# Patient Record
Sex: FEMALE | Race: WHITE | NOT HISPANIC OR LATINO | URBAN - METROPOLITAN AREA
[De-identification: names, ages, dates, MRNs, and addresses within clinical notes are randomized per-mention and may not be internally consistent; named-entity substitution may affect disease eponyms.]

---

## 2022-05-30 ENCOUNTER — OFFICE VISIT (OUTPATIENT)
Dept: FAMILY MEDICINE | Facility: CLINIC | Age: 67
End: 2022-05-30
Payer: MEDICARE

## 2022-05-30 ENCOUNTER — TELEPHONE (OUTPATIENT)
Dept: FAMILY MEDICINE | Facility: CLINIC | Age: 67
End: 2022-05-30

## 2022-05-30 VITALS
BODY MASS INDEX: 34.49 KG/M2 | OXYGEN SATURATION: 95 % | RESPIRATION RATE: 18 BRPM | HEIGHT: 65 IN | WEIGHT: 207 LBS | HEART RATE: 80 BPM | SYSTOLIC BLOOD PRESSURE: 100 MMHG | TEMPERATURE: 98 F | DIASTOLIC BLOOD PRESSURE: 68 MMHG

## 2022-05-30 DIAGNOSIS — R26.89 IMPAIRED GAIT AND MOBILITY: Chronic | ICD-10-CM

## 2022-05-30 DIAGNOSIS — E66.09 CLASS 1 OBESITY DUE TO EXCESS CALORIES WITH SERIOUS COMORBIDITY AND BODY MASS INDEX (BMI) OF 34.0 TO 34.9 IN ADULT: Chronic | ICD-10-CM

## 2022-05-30 DIAGNOSIS — M81.0 OSTEOPOROSIS, POSTMENOPAUSAL: ICD-10-CM

## 2022-05-30 DIAGNOSIS — Z11.59 ENCOUNTER FOR SCREENING FOR OTHER VIRAL DISEASES: ICD-10-CM

## 2022-05-30 DIAGNOSIS — G47.33 OSA ON CPAP: Chronic | ICD-10-CM

## 2022-05-30 DIAGNOSIS — E11.9 TYPE 2 DIABETES MELLITUS WITHOUT COMPLICATION, WITHOUT LONG-TERM CURRENT USE OF INSULIN: Primary | Chronic | ICD-10-CM

## 2022-05-30 DIAGNOSIS — Z13.820 OSTEOPOROSIS SCREENING: ICD-10-CM

## 2022-05-30 DIAGNOSIS — I50.32 CHRONIC DIASTOLIC CONGESTIVE HEART FAILURE: Chronic | ICD-10-CM

## 2022-05-30 DIAGNOSIS — I48.19 PERSISTENT ATRIAL FIBRILLATION: Chronic | ICD-10-CM

## 2022-05-30 DIAGNOSIS — J45.40 MODERATE PERSISTENT ASTHMA WITHOUT COMPLICATION: Chronic | ICD-10-CM

## 2022-05-30 PROBLEM — E66.811 CLASS 1 OBESITY DUE TO EXCESS CALORIES WITH SERIOUS COMORBIDITY AND BODY MASS INDEX (BMI) OF 34.0 TO 34.9 IN ADULT: Status: ACTIVE | Noted: 2022-05-30

## 2022-05-30 PROCEDURE — 99204 OFFICE O/P NEW MOD 45 MIN: CPT | Mod: S$GLB,,, | Performed by: NURSE PRACTITIONER

## 2022-05-30 PROCEDURE — 99204 PR OFFICE/OUTPT VISIT, NEW, LEVL IV, 45-59 MIN: ICD-10-PCS | Mod: S$GLB,,, | Performed by: NURSE PRACTITIONER

## 2022-05-30 RX ORDER — METOPROLOL TARTRATE 100 MG/1
100 TABLET ORAL 2 TIMES DAILY
COMMUNITY
Start: 2022-05-10 | End: 2023-02-23 | Stop reason: DRUGHIGH

## 2022-05-30 RX ORDER — DAPAGLIFLOZIN 10 MG/1
10 TABLET, FILM COATED ORAL DAILY
COMMUNITY
End: 2023-02-23 | Stop reason: DRUGHIGH

## 2022-05-30 RX ORDER — WARFARIN 6 MG/1
6 TABLET ORAL DAILY
COMMUNITY
Start: 2022-05-05 | End: 2023-02-23 | Stop reason: DRUGHIGH

## 2022-05-30 RX ORDER — OMEPRAZOLE 40 MG/1
40 CAPSULE, DELAYED RELEASE ORAL DAILY
COMMUNITY

## 2022-05-30 RX ORDER — DIGOXIN 125 MCG
0.12 TABLET ORAL DAILY
COMMUNITY
Start: 2022-05-13

## 2022-05-30 RX ORDER — GLIMEPIRIDE 1 MG/1
1 TABLET ORAL EVERY MORNING
COMMUNITY
Start: 2022-05-23 | End: 2023-02-23 | Stop reason: DRUGHIGH

## 2022-05-30 RX ORDER — POTASSIUM CHLORIDE 750 MG/1
10 TABLET, EXTENDED RELEASE ORAL DAILY
COMMUNITY
Start: 2022-05-05 | End: 2022-06-13 | Stop reason: DRUGHIGH

## 2022-05-30 RX ORDER — FUROSEMIDE 80 MG/1
80 TABLET ORAL DAILY
COMMUNITY
Start: 2022-05-11 | End: 2023-02-23 | Stop reason: DRUGHIGH

## 2022-05-30 RX ORDER — METFORMIN HYDROCHLORIDE 1000 MG/1
1000 TABLET ORAL 3 TIMES DAILY
COMMUNITY
End: 2023-02-23 | Stop reason: DRUGHIGH

## 2022-05-30 NOTE — TELEPHONE ENCOUNTER
----- Message from Elisha Fleming sent at 5/30/2022 11:10 AM CDT -----  Patient need to speak to nurse regarding a prescription for a bedside commode . Call back number [phone] (home)

## 2022-05-30 NOTE — PROGRESS NOTES
"Subjective:       Patient ID: Joanna Flores is a 67 y.o. female.    Chief Complaint: Establish Care (Pt is here to establish care. Pt doesn't have a previous PCP. Pt is also here for a hospital follow up. Pt states she has been feeling weak and overall not good since being discharged.)    Here to establish primary care. She is a 66 yo female. She lives in [place]. She is  with 2 adult children and 1 grandchild. Her  passed from leukemia 15 yr ago.She does not smoke.     She has Atrial fib, DM2, obesity, CHF, asthma, MARCI/CPAP. Her specialists have been urging her to get established with a primary care provider for some time. At present, she sees Dr Pulido who is her pulmonologist, David SMITH who is her endocrinologist, and Dr Castañeda who is her cardiologist.     She was recently admitted to [facility] in May 2022 with uncontrolled A fib with RVR and sepsis secondary to cellulitis LLL. She reports she "feeling better" since her hospital admission--just feeling tired. She has f/u appt schedule with her cardiologist and endocrinologist within the next month. She is compliant with all her medications. He believes her last A1c was around 7.0% and her endocrinologist was very pleased with the results. No new issues, just wants to get established.         Review of Systems   Constitutional: Positive for fatigue. Negative for activity change, appetite change and fever.   Respiratory: Negative for chest tightness and shortness of breath.    Cardiovascular: Negative for chest pain and palpitations.   Gastrointestinal: Negative for abdominal pain, diarrhea, nausea and vomiting.   Neurological: Negative for dizziness, weakness, light-headedness and headaches.   Psychiatric/Behavioral: Negative for dysphoric mood and sleep disturbance. The patient is not nervous/anxious.            Past Medical History:  Past Medical History:   Diagnosis Date    Asthma     Atrial fibrillation     CHF (congestive heart " failure)     Diabetes mellitus, type 2       Past Surgical History:   Procedure Laterality Date    BLADDER SUSPENSION      CHOLECYSTECTOMY      EYE SURGERY      HYSTERECTOMY      MIDDLE EAR SURGERY      NECK SURGERY        Review of patient's allergies indicates:   Allergen Reactions    Cefazolin     Egg     Lactase     Meperidine       Current Outpatient Medications   Medication Sig Dispense Refill    dapagliflozin (FARXIGA) 10 mg tablet Take 10 mg by mouth once daily.      digoxin (LANOXIN) 125 mcg tablet Take 0.125 mg by mouth once daily.      furosemide (LASIX) 80 MG tablet Take 80 mg by mouth once daily.      glimepiride (AMARYL) 1 MG tablet Take 1 mg by mouth every morning.      metFORMIN (GLUCOPHAGE) 1000 MG tablet Take 1,000 mg by mouth 3 (three) times daily.      metoprolol tartrate (LOPRESSOR) 100 MG tablet Take 100 mg by mouth 2 (two) times a day.      omeprazole (PRILOSEC) 40 MG capsule Take 40 mg by mouth once daily.      potassium chloride (KLOR-CON) 10 MEQ TbSR Take 10 mEq by mouth once daily.      warfarin (COUMADIN) 6 MG tablet Take 6 mg by mouth Daily.       No current facility-administered medications for this visit.     Social History     Socioeconomic History    Marital status: Unknown   Tobacco Use    Smoking status: Never Smoker    Smokeless tobacco: Never Used   Substance and Sexual Activity    Alcohol use: Never    Drug use: Never    Sexual activity: Not Currently      Family History   Problem Relation Age of Onset    Cancer Mother     Heart disease Father     Cancer Father         Objective:      Physical Exam  Constitutional:       Appearance: She is well-developed. She is obese.   HENT:      Head: Normocephalic and atraumatic.   Eyes:      General: No scleral icterus.     Conjunctiva/sclera: Conjunctivae normal.      Pupils: Pupils are equal, round, and reactive to light.   Neck:      Thyroid: No thyroid mass.      Trachea: Trachea normal.   Cardiovascular:       Rate and Rhythm: Normal rate. Rhythm irregular.      Heart sounds: Normal heart sounds.   Pulmonary:      Effort: Pulmonary effort is normal.      Breath sounds: Normal breath sounds.   Musculoskeletal:      Cervical back: Normal range of motion and neck supple.   Neurological:      Mental Status: She is alert and oriented to person, place, and time.   Psychiatric:         Behavior: Behavior normal.         Judgment: Judgment normal.         Assessment:     1. Type 2 diabetes mellitus without complication, without long-term current use of insulin Stable   2. Chronic diastolic congestive heart failure Stable   3. Persistent atrial fibrillation Stable   4. Moderate persistent asthma without complication Stable   5. MARCI on CPAP Stable   6. Class 1 obesity due to excess calories with serious comorbidity and body mass index (BMI) of 34.0 to 34.9 in adult Active   7. Impaired gait and mobility Active   8. Encounter for screening for other viral diseases    9. Osteoporosis screening    10. Osteoporosis, postmenopausal      Plan:       PROBLEM LIST     Type 2 diabetes mellitus without complication, without long-term current use of insulin  Comments:  Last A1c 7.0%; will obtain new A1c when she returns for labs Wed; I will share lab results w/ all her specialists.   Orders:  -     CBC Auto Differential; Future; Expected date: 05/30/2022  -     Comprehensive Metabolic Panel; Future; Expected date: 05/30/2022  -     TSH w/reflex to FT4; Future; Expected date: 05/30/2022  -     Lipid Panel; Future; Expected date: 05/30/2022  -     Iron and TIBC; Future; Expected date: 05/30/2022  -     Hemoglobin A1C; Future; Expected date: 05/30/2022    Chronic diastolic congestive heart failure  Comments:  stable since her discharge from hospital; 30 lb weight loss since treating with diuretic  Orders:  -     CBC Auto Differential; Future; Expected date: 05/30/2022  -     Comprehensive Metabolic Panel; Future; Expected date: 05/30/2022  -      TSH w/reflex to FT4; Future; Expected date: 05/30/2022  -     Lipid Panel; Future; Expected date: 05/30/2022  -     Iron and TIBC; Future; Expected date: 05/30/2022    Persistent atrial fibrillation  Comments:  rate controlled w/ current regimen  Orders:  -     CBC Auto Differential; Future; Expected date: 05/30/2022  -     Comprehensive Metabolic Panel; Future; Expected date: 05/30/2022  -     TSH w/reflex to FT4; Future; Expected date: 05/30/2022  -     Lipid Panel; Future; Expected date: 05/30/2022  -     Iron and TIBC; Future; Expected date: 05/30/2022    Moderate persistent asthma without complication  Comments:  no recent exacerbations; asked to bring all inhalers to her next appt    MARCI on CPAP  Comments:  compliant w/ CPAP; continue    Class 1 obesity due to excess calories with serious comorbidity and body mass index (BMI) of 34.0 to 34.9 in adult  Comments:  recommend low fat ADA diet and regular exercise (walking) 4-5 days per week to reduce mean glucose and BMI    Impaired gait and mobility  Comments:  required walking aid ( has cane today); she is requesting a bedside commode due to her fall risk at night; order placed  Orders:  -     COMMODE FOR HOME USE    Encounter for screening for other viral diseases  -     Hepatitis C antibody; Future; Expected date: 05/30/2022    Osteoporosis screening  -     DXA Bone Density Spine And Hip; Future; Expected date: 05/30/2022    Osteoporosis, postmenopausal  -     DXA Bone Density Spine And Hip; Future; Expected date: 05/30/2022        Will attempt to obtain all historical medical records from her specialists

## 2022-06-01 LAB
ABS NRBC COUNT: 0 X 10 3/UL (ref 0–0.01)
ABSOLUTE BASOPHIL: 0.05 X 10 3/UL (ref 0–0.22)
ABSOLUTE EOSINOPHIL: 0.07 X 10 3/UL (ref 0.04–0.54)
ABSOLUTE IMMATURE GRAN: 0.05 X 10 3/UL (ref 0–0.04)
ABSOLUTE LYMPHOCYTE: 1.36 X 10 3/UL (ref 0.86–4.75)
ABSOLUTE MONOCYTE: 0.84 X 10 3/UL (ref 0.22–1.08)
ALBUMIN SERPL-MCNC: 4.6 G/DL (ref 3.5–5.2)
ALBUMIN/GLOB SERPL ELPH: 1.6 {RATIO} (ref 1–2.7)
ALP ISOS SERPL LEV INH-CCNC: 80 U/L (ref 35–105)
ALT (SGPT): 50 U/L (ref 0–33)
ANION GAP SERPL CALC-SCNC: 17 MMOL/L (ref 8–17)
AST SERPL-CCNC: 32 U/L (ref 0–32)
BASOPHILS NFR BLD: 0.5 % (ref 0.2–1.2)
BILIRUBIN, TOTAL: 0.57 MG/DL (ref 0–1.2)
BUN/CREAT SERPL: 39.4 (ref 6–20)
CALCIUM SERPL-MCNC: 10.1 MG/DL (ref 8.6–10.2)
CARBON DIOXIDE, CO2: 32 MMOL/L (ref 22–29)
CHLORIDE: 89 MMOL/L (ref 98–107)
CHOLEST SERPL-MSCNC: 184 MG/DL (ref 100–200)
CREAT SERPL-MCNC: 1.28 MG/DL (ref 0.5–0.9)
EOSINOPHIL NFR BLD: 0.7 % (ref 0.7–7)
GFR ESTIMATION: 41.35
GLOBULIN: 2.9 G/DL (ref 1.5–4.5)
GLUCOSE: 237 MG/DL (ref 82–115)
HCT VFR BLD AUTO: 44.3 % (ref 37–47)
HCV IGG SERPL QL IA: NONREACTIVE
HDLC SERPL-MCNC: 44 MG/DL
HGB BLD-MCNC: 14.4 G/DL (ref 12–16)
IMMATURE GRANULOCYTES: 0.5 % (ref 0–0.5)
IRON BINDING CAPACITY: 347 UG/DL (ref 262–472)
IRON SERPL-MCNC: 52 UG/DL (ref 37–145)
LDL/HDL RATIO: 2.2 (ref 1–3)
LDLC SERPL CALC-MCNC: 96.2 MG/DL (ref 0–100)
LYMPHOCYTES NFR BLD: 14 % (ref 19.3–53.1)
MCH RBC QN AUTO: 28.9 PG (ref 27–32)
MCHC RBC AUTO-ENTMCNC: 32.5 G/DL (ref 32–36)
MCV RBC AUTO: 88.8 FL (ref 82–100)
MONOCYTES NFR BLD: 8.6 % (ref 4.7–12.5)
NEUTROPHILS # BLD AUTO: 7.36 X 10 3/UL (ref 2.15–7.56)
NEUTROPHILS NFR BLD: 75.7 % (ref 34–71.1)
NUCLEATED RED BLOOD CELLS: 0 /100 WBC (ref 0–0.2)
PLATELET # BLD AUTO: 326 X 10 3/UL (ref 135–400)
POTASSIUM: 2.7 MMOL/L (ref 3.5–5.1)
PROT SNV-MCNC: 7.5 G/DL (ref 6.4–8.3)
RBC # BLD AUTO: 4.99 X 10 6/UL (ref 4.2–5.4)
RDW-SD: 43.6 FL (ref 37–54)
SODIUM: 138 MMOL/L (ref 136–145)
TRIGL SERPL-MCNC: 219 MG/DL (ref 0–150)
TSH W/REFLEX TO FT4: 0.71 UIU/ML (ref 0.27–4.2)
UIBC SERPL-MCNC: 295 UG/DL (ref 112–306)
UREA NITROGEN (BUN): 50.4 MG/DL (ref 8–23)
WBC # BLD: 9.73 X 10 3/UL (ref 4.3–10.8)

## 2022-06-03 ENCOUNTER — TELEPHONE (OUTPATIENT)
Dept: FAMILY MEDICINE | Facility: CLINIC | Age: 67
End: 2022-06-03
Payer: MEDICARE

## 2022-06-03 ENCOUNTER — TELEPHONE (OUTPATIENT)
Dept: FAMILY MEDICINE | Facility: CLINIC | Age: 67
End: 2022-06-03

## 2022-06-03 ENCOUNTER — OFFICE VISIT (OUTPATIENT)
Dept: FAMILY MEDICINE | Facility: CLINIC | Age: 67
End: 2022-06-03
Payer: MEDICARE

## 2022-06-03 DIAGNOSIS — N39.0 ACUTE UTI: ICD-10-CM

## 2022-06-03 DIAGNOSIS — R30.0 DYSURIA: Primary | ICD-10-CM

## 2022-06-03 LAB
BILIRUB SERPL-MCNC: NEGATIVE MG/DL
BLOOD URINE, POC: ABNORMAL
CLARITY, POC UA: ABNORMAL
COLOR, POC UA: YELLOW
GLUCOSE UR QL STRIP: 500
KETONES UR QL STRIP: NEGATIVE
LEUKOCYTE ESTERASE URINE, POC: ABNORMAL
NITRITE, POC UA: POSITIVE
PH, POC UA: 5.5
PROTEIN, POC: NEGATIVE
SPECIFIC GRAVITY, POC UA: 1.01
UROBILINOGEN, POC UA: 0.2

## 2022-06-03 PROCEDURE — 99211 OFF/OP EST MAY X REQ PHY/QHP: CPT | Mod: 25,S$GLB,, | Performed by: NURSE PRACTITIONER

## 2022-06-03 PROCEDURE — 81002 POCT URINE DIPSTICK WITHOUT MICROSCOPE: ICD-10-PCS | Mod: S$GLB,,, | Performed by: NURSE PRACTITIONER

## 2022-06-03 PROCEDURE — 81002 URINALYSIS NONAUTO W/O SCOPE: CPT | Mod: S$GLB,,, | Performed by: NURSE PRACTITIONER

## 2022-06-03 PROCEDURE — 99211 PR OFFICE/OUTPT VISIT, EST, LEVL I: ICD-10-PCS | Mod: 25,S$GLB,, | Performed by: NURSE PRACTITIONER

## 2022-06-03 RX ORDER — CIPROFLOXACIN 500 MG/1
500 TABLET ORAL 2 TIMES DAILY
Qty: 10 TABLET | Refills: 0 | Status: SHIPPED | OUTPATIENT
Start: 2022-06-03 | End: 2022-06-13 | Stop reason: ALTCHOICE

## 2022-06-03 RX ORDER — PHENAZOPYRIDINE HYDROCHLORIDE 200 MG/1
200 TABLET, FILM COATED ORAL 3 TIMES DAILY PRN
Qty: 12 TABLET | Refills: 0 | Status: SHIPPED | OUTPATIENT
Start: 2022-06-03 | End: 2022-06-07

## 2022-06-03 NOTE — TELEPHONE ENCOUNTER
----- Message from Jacob Alvarez sent at 6/3/2022  8:56 AM CDT -----  Contact: self  Patient called and stated she is burning when she urinate. Patient ask for a call back at [phone]

## 2022-06-03 NOTE — TELEPHONE ENCOUNTER
----- Message from Maday Vallejo sent at 6/3/2022 10:29 AM CDT -----  [facility] calling for clarity on script for ciprofloxacin HCl (CIPRO) 500 MG They can be reached at [phone]. Pharmacy stated there is a drug interaction.     Thanks,

## 2022-06-06 DIAGNOSIS — N39.0 ACUTE UTI: Primary | ICD-10-CM

## 2022-06-06 RX ORDER — DOXYCYCLINE HYCLATE 100 MG
100 TABLET ORAL 2 TIMES DAILY
Qty: 20 TABLET | Refills: 0 | Status: SHIPPED | OUTPATIENT
Start: 2022-06-06 | End: 2023-02-23 | Stop reason: DRUGHIGH

## 2022-06-07 ENCOUNTER — TELEPHONE (OUTPATIENT)
Dept: FAMILY MEDICINE | Facility: CLINIC | Age: 67
End: 2022-06-07
Payer: MEDICARE

## 2022-06-07 NOTE — TELEPHONE ENCOUNTER
----- Message from Piper Tao sent at 6/7/2022  1:28 PM CDT -----  ismael carlson/ [facility] needs to know how much insulin she is to take..[phone]

## 2022-06-13 ENCOUNTER — OFFICE VISIT (OUTPATIENT)
Dept: FAMILY MEDICINE | Facility: CLINIC | Age: 67
End: 2022-06-13
Payer: MEDICARE

## 2022-06-13 VITALS
WEIGHT: 213 LBS | HEART RATE: 71 BPM | RESPIRATION RATE: 18 BRPM | TEMPERATURE: 96 F | SYSTOLIC BLOOD PRESSURE: 117 MMHG | BODY MASS INDEX: 35.49 KG/M2 | DIASTOLIC BLOOD PRESSURE: 67 MMHG | HEIGHT: 65 IN | OXYGEN SATURATION: 95 %

## 2022-06-13 DIAGNOSIS — B37.2 CANDIDAL INTERTRIGO: Primary | ICD-10-CM

## 2022-06-13 DIAGNOSIS — Z12.11 COLON CANCER SCREENING: ICD-10-CM

## 2022-06-13 DIAGNOSIS — R11.0 NAUSEA: ICD-10-CM

## 2022-06-13 DIAGNOSIS — E87.6 LOW SERUM POTASSIUM: ICD-10-CM

## 2022-06-13 DIAGNOSIS — G47.33 OSA ON CPAP: Chronic | ICD-10-CM

## 2022-06-13 DIAGNOSIS — E66.09 CLASS 1 OBESITY DUE TO EXCESS CALORIES WITH SERIOUS COMORBIDITY AND BODY MASS INDEX (BMI) OF 34.0 TO 34.9 IN ADULT: Chronic | ICD-10-CM

## 2022-06-13 DIAGNOSIS — E11.9 TYPE 2 DIABETES MELLITUS WITHOUT COMPLICATION, WITHOUT LONG-TERM CURRENT USE OF INSULIN: Chronic | ICD-10-CM

## 2022-06-13 DIAGNOSIS — I48.19 PERSISTENT ATRIAL FIBRILLATION: Chronic | ICD-10-CM

## 2022-06-13 DIAGNOSIS — J45.40 MODERATE PERSISTENT ASTHMA WITHOUT COMPLICATION: Chronic | ICD-10-CM

## 2022-06-13 DIAGNOSIS — I50.32 CHRONIC DIASTOLIC CONGESTIVE HEART FAILURE: Chronic | ICD-10-CM

## 2022-06-13 PROCEDURE — 99214 PR OFFICE/OUTPT VISIT, EST, LEVL IV, 30-39 MIN: ICD-10-PCS | Mod: S$GLB,,, | Performed by: NURSE PRACTITIONER

## 2022-06-13 PROCEDURE — 99214 OFFICE O/P EST MOD 30 MIN: CPT | Mod: S$GLB,,, | Performed by: NURSE PRACTITIONER

## 2022-06-13 RX ORDER — PEN NEEDLE, DIABETIC 32GX 5/32"
1 NEEDLE, DISPOSABLE MISCELLANEOUS DAILY
COMMUNITY
Start: 2022-06-02

## 2022-06-13 RX ORDER — NYSTATIN 100000 [USP'U]/G
POWDER TOPICAL 4 TIMES DAILY
Qty: 60 G | Refills: 3 | Status: SHIPPED | OUTPATIENT
Start: 2022-06-13

## 2022-06-13 RX ORDER — POTASSIUM CHLORIDE 1500 MG/1
20 TABLET, EXTENDED RELEASE ORAL 2 TIMES DAILY
COMMUNITY
Start: 2022-06-06 | End: 2023-02-23 | Stop reason: DRUGHIGH

## 2022-06-13 RX ORDER — METOCLOPRAMIDE 10 MG/1
10 TABLET ORAL 3 TIMES DAILY PRN
Qty: 60 TABLET | Refills: 0 | Status: SHIPPED | OUTPATIENT
Start: 2022-06-13 | End: 2023-02-23 | Stop reason: DRUGHIGH

## 2022-06-13 RX ORDER — INSULIN GLARGINE 300 U/ML
20 INJECTION, SOLUTION SUBCUTANEOUS DAILY
COMMUNITY
Start: 2022-06-02

## 2022-06-13 RX ORDER — IPRATROPIUM BROMIDE AND ALBUTEROL SULFATE 2.5; .5 MG/3ML; MG/3ML
3 SOLUTION RESPIRATORY (INHALATION) EVERY 6 HOURS PRN
COMMUNITY
Start: 2022-06-08

## 2022-06-13 NOTE — PROGRESS NOTES
"Subjective:       Patient ID: Joanna Flores is a 67 y.o. female.    Chief Complaint: Follow-up (Pt is here for a 2 week follow up and lab review.)    She is a 66 yo female. She lives in [place]. She is  with 2 adult children and 1 grandchild. Her  passed from leukemia 15 yr ago.She does not smoke.      She has Atrial fib, DM2, obesity, CHF, asthma, MARCI/CPAP. Her specialists have been urging her to get established with a primary care provider for some time. At present, she sees Dr Pulido who is her pulmonologist, David SMITH who is her endocrinologist, and Dr Castañeda who is her cardiologist.      She was recently admitted to [facility] in May 2022 with uncontrolled A fib with RVR and sepsis secondary to cellulitis LLL. She reports she "feeling better" since her hospital admission--just feeling tired. She has f/u appt schedule with her cardiologist and endocrinologist within the next month. She is compliant with all her medications. He believes her last A1c was around 7.0% and her endocrinologist was very pleased with the results. No new issues, just wants to get established.     Review of Systems   Constitutional: Positive for fatigue (improving with potassium supplement). Negative for activity change, appetite change and fever.   Respiratory: Negative for chest tightness and shortness of breath.    Cardiovascular: Negative for chest pain and palpitations.   Gastrointestinal: Negative for abdominal pain, diarrhea, nausea and vomiting.   Skin: Positive for rash.   Neurological: Negative for dizziness, weakness, light-headedness and headaches.   Psychiatric/Behavioral: Negative for dysphoric mood and sleep disturbance. The patient is not nervous/anxious.            Past Medical History:  Past Medical History:   Diagnosis Date    Asthma     Atrial fibrillation     CHF (congestive heart failure)     Chronic atrial fibrillation     Coronary artery disease     Diabetes mellitus, type 2     " "GERD (gastroesophageal reflux disease)     Hypertension     Liver disease     Obesity     MARCI (obstructive sleep apnea)     Pleural effusion     Tachycardia       Past Surgical History:   Procedure Laterality Date    BLADDER SUSPENSION      CHOLECYSTECTOMY      EYE SURGERY      HYSTERECTOMY      MIDDLE EAR SURGERY      NECK SURGERY        Review of patient's allergies indicates:   Allergen Reactions    Cefazolin     Egg     Lactase     Meperidine       Current Outpatient Medications   Medication Sig Dispense Refill    albuterol-ipratropium (DUO-NEB) 2.5 mg-0.5 mg/3 mL nebulizer solution Take 3 mLs by nebulization every 6 (six) hours as needed.      BD DEVENDRA 2ND GEN PEN NEEDLE 32 gauge x 5/32" Ndle Inject 1 pen into the skin Daily.      dapagliflozin (FARXIGA) 10 mg tablet Take 10 mg by mouth once daily.      digoxin (LANOXIN) 125 mcg tablet Take 0.125 mg by mouth once daily.      doxycycline (VIBRA-TABS) 100 MG tablet Take 1 tablet (100 mg total) by mouth 2 (two) times daily. 20 tablet 0    furosemide (LASIX) 80 MG tablet Take 80 mg by mouth once daily.      glimepiride (AMARYL) 1 MG tablet Take 1 mg by mouth every morning.      metFORMIN (GLUCOPHAGE) 1000 MG tablet Take 1,000 mg by mouth 3 (three) times daily.      metoprolol tartrate (LOPRESSOR) 100 MG tablet Take 100 mg by mouth 2 (two) times a day.      omeprazole (PRILOSEC) 40 MG capsule Take 40 mg by mouth once daily.      potassium chloride (K-TAB) 20 mEq Take 20 mEq by mouth 2 (two) times daily.      TOUJEO SOLOSTAR U-300 INSULIN 300 unit/mL (1.5 mL) InPn pen Inject 20 Units into the skin Daily.      warfarin (COUMADIN) 6 MG tablet Take 6 mg by mouth Daily.      metoclopramide HCl (REGLAN) 10 MG tablet Take 1 tablet (10 mg total) by mouth 3 (three) times daily as needed (nausea). 60 tablet 0    nystatin (MYCOSTATIN) powder Apply topically 4 (four) times daily. 60 g 3     No current facility-administered medications for this " visit.     Social History     Socioeconomic History    Marital status: Unknown   Tobacco Use    Smoking status: Never Smoker    Smokeless tobacco: Never Used   Substance and Sexual Activity    Alcohol use: Never    Drug use: Never    Sexual activity: Not Currently      Family History   Problem Relation Age of Onset    Cancer Mother     Heart disease Father     Cancer Father     Hypertension Father         Objective:      Physical Exam  Constitutional:       Appearance: She is well-developed. She is obese.   HENT:      Head: Normocephalic and atraumatic.   Eyes:      General: No scleral icterus.     Conjunctiva/sclera: Conjunctivae normal.      Pupils: Pupils are equal, round, and reactive to light.   Neck:      Thyroid: No thyroid mass.      Trachea: Trachea normal.   Cardiovascular:      Rate and Rhythm: Normal rate. Rhythm irregular.      Heart sounds: Normal heart sounds.   Pulmonary:      Effort: Pulmonary effort is normal.      Breath sounds: Normal breath sounds.   Musculoskeletal:      Cervical back: Normal range of motion and neck supple.   Skin:     Findings: Rash (candidal rash under stomach) present.   Neurological:      Mental Status: She is alert and oriented to person, place, and time.   Psychiatric:         Behavior: Behavior normal.         Judgment: Judgment normal.         Assessment:     1. Candidal intertrigo Acute   2. Type 2 diabetes mellitus without complication, without long-term current use of insulin Sub-optimally controlled   3. Low serum potassium    4. Nausea Acute   5. Chronic diastolic congestive heart failure Stable   6. Persistent atrial fibrillation Stable   7. Moderate persistent asthma without complication Stable   8. MARCI on CPAP Stable   9. Class 1 obesity due to excess calories with serious comorbidity and body mass index (BMI) of 34.0 to 34.9 in adult Stable   10. Colon cancer screening      Plan:       PROBLEM LIST     Candidal intertrigo  Comments:  start Nystatin  powder  Orders:  -     nystatin (MYCOSTATIN) powder; Apply topically 4 (four) times daily.  Dispense: 60 g; Refill: 3    Type 2 diabetes mellitus without complication, without long-term current use of insulin  Comments:  reviewed labs which showed her A1c was 9.1% Sent labs to her endocrinologist who started her on insulin.     Low serum potassium  Comments:  started on daily oral supplement; fatigue symptoms have improved.     Nausea  Comments:  providing her w/ prn antiemetic  Orders:  -     metoclopramide HCl (REGLAN) 10 MG tablet; Take 1 tablet (10 mg total) by mouth 3 (three) times daily as needed (nausea).  Dispense: 60 tablet; Refill: 0    Chronic diastolic congestive heart failure  Comments:  stable since her discharge from hospital; 30 lb weight loss since treating with diuretic; appt later this week with Dr Castañeda; forwarded her labs to him    Persistent atrial fibrillation  Comments:  rate controlled    Moderate persistent asthma without complication    MARCI on CPAP    Class 1 obesity due to excess calories with serious comorbidity and body mass index (BMI) of 34.0 to 34.9 in adult  Comments:  recommend low fat ADA diet and regular exercise most days of the week     Colon cancer screening  -     Cologuard Screening (Multitarget Stool DNA); Future; Expected date: 06/13/2022

## 2023-01-17 DIAGNOSIS — N39.0 UTI (URINARY TRACT INFECTION): Primary | ICD-10-CM

## 2023-02-01 ENCOUNTER — TELEPHONE (OUTPATIENT)
Dept: UROLOGY | Facility: CLINIC | Age: 68
End: 2023-02-01

## 2023-02-01 NOTE — TELEPHONE ENCOUNTER
----- Message from Africa Carballo sent at 2/1/2023 11:31 AM CST -----  Pt is requesting a call back in regards to her upcoming appt. Pt states that Thursday and Friday are not good days for her and when she came in to the office today and her appt had to be reschedule because NP was out having a baby she didn't know that she was schedule for a Thursday until she made it home. Please call pt back to change her appt date. Pt didn't want to be schedule in March. Pt can be reached at [phone] (home)

## 2023-02-01 NOTE — TELEPHONE ENCOUNTER
Pt wanted to reschedule apt w/[facility] but after several tries we were unable to find anything for [facility] nor [facility] before mid march. Pt decided to keep apt.

## 2023-02-23 ENCOUNTER — OFFICE VISIT (OUTPATIENT)
Dept: UROLOGY | Facility: CLINIC | Age: 68
End: 2023-02-23
Payer: MEDICARE

## 2023-02-23 VITALS
BODY MASS INDEX: 35.17 KG/M2 | SYSTOLIC BLOOD PRESSURE: 136 MMHG | RESPIRATION RATE: 20 BRPM | WEIGHT: 211.06 LBS | DIASTOLIC BLOOD PRESSURE: 79 MMHG | HEART RATE: 66 BPM | HEIGHT: 65 IN

## 2023-02-23 DIAGNOSIS — N30.00 ACUTE CYSTITIS WITHOUT HEMATURIA: Primary | ICD-10-CM

## 2023-02-23 PROCEDURE — 99214 OFFICE O/P EST MOD 30 MIN: CPT | Mod: S$GLB,ICN,, | Performed by: NURSE PRACTITIONER

## 2023-02-23 PROCEDURE — 99214 PR OFFICE/OUTPT VISIT, EST, LEVL IV, 30-39 MIN: ICD-10-PCS | Mod: S$GLB,ICN,, | Performed by: NURSE PRACTITIONER

## 2023-02-23 RX ORDER — WARFARIN 6 MG/1
6 TABLET ORAL
COMMUNITY

## 2023-02-23 RX ORDER — DAPAGLIFLOZIN 10 MG/1
10 TABLET, FILM COATED ORAL
COMMUNITY

## 2023-02-23 RX ORDER — CLOBETASOL PROPIONATE 0.5 MG/G
OINTMENT TOPICAL
COMMUNITY
Start: 2022-10-03

## 2023-02-23 RX ORDER — ESTRADIOL 0.1 MG/G
1 CREAM VAGINAL
Qty: 60 G | Refills: 2 | Status: SHIPPED | OUTPATIENT
Start: 2023-02-23 | End: 2024-02-23

## 2023-02-23 RX ORDER — AMOXICILLIN AND CLAVULANATE POTASSIUM 875; 125 MG/1; MG/1
1 TABLET, FILM COATED ORAL 2 TIMES DAILY
Qty: 20 TABLET | Refills: 0 | Status: SHIPPED | OUTPATIENT
Start: 2023-02-23

## 2023-02-23 RX ORDER — EVOLOCUMAB 140 MG/ML
INJECTION, SOLUTION SUBCUTANEOUS
COMMUNITY
Start: 2022-12-23

## 2023-02-23 RX ORDER — METOLAZONE 2.5 MG/1
TABLET ORAL
COMMUNITY
Start: 2022-12-14

## 2023-02-23 RX ORDER — WARFARIN 1 MG/1
3 TABLET ORAL
COMMUNITY

## 2023-02-23 RX ORDER — SILVER SULFADIAZINE 10 G/1000G
CREAM TOPICAL
COMMUNITY
Start: 2023-02-16

## 2023-02-23 RX ORDER — DIGOXIN 125 MCG
0.12 TABLET ORAL
COMMUNITY

## 2023-02-23 RX ORDER — AZELAIC ACID 0.15 G/G
1 GEL TOPICAL 2 TIMES DAILY
COMMUNITY
Start: 2022-12-07

## 2023-02-23 RX ORDER — NAPROXEN SODIUM 220 MG/1
81 TABLET, FILM COATED ORAL
COMMUNITY

## 2023-02-23 RX ORDER — GLIMEPIRIDE 1 MG/1
1 TABLET ORAL 2 TIMES DAILY
COMMUNITY

## 2023-02-23 RX ORDER — POTASSIUM CHLORIDE 750 MG/1
TABLET, EXTENDED RELEASE ORAL
COMMUNITY

## 2023-02-23 RX ORDER — FUROSEMIDE 80 MG/1
80 TABLET ORAL
COMMUNITY

## 2023-02-23 RX ORDER — METOPROLOL TARTRATE 100 MG/1
50 TABLET ORAL DAILY
COMMUNITY

## 2023-02-23 RX ORDER — ALBUTEROL SULFATE 90 UG/1
AEROSOL, METERED RESPIRATORY (INHALATION)
COMMUNITY

## 2023-02-23 NOTE — PROGRESS NOTES
Subjective:       Patient ID: Joanna Flores is a 67 y.o. female.    Chief Complaint: Urinary Tract Infection (Patinet c/o frequency, burning & dysuria with urination. )      HPI: 67-year-old female new to the service seen today for symptoms of recurring UTI.  States she is in the hospital in the not too distant past with sepsis had a PICC line since that time she is had several culture documented UTIs.  She will complete around of antibiotics only to become symptomatic shortly thereafter and test positive again for UTI symptoms include bladder pain and mild dysuria. No gross hematuria.  She is afebrile.  Denies stress incontinence, but has periods of frequency urgency with urge incontinence.  She states sometimes she will wake up wet from her sleep.  She wears approximately 10 pads per day.  Patient does report intermittent periods double voiding to completely empty.       Past Medical History:   Past Medical History:   Diagnosis Date    Asthma     Atrial fibrillation     CHF (congestive heart failure)     Chronic atrial fibrillation     Coronary artery disease     Diabetes mellitus, type 2     GERD (gastroesophageal reflux disease)     Hypertension     Liver disease     Obesity     MARCI (obstructive sleep apnea)     Pleural effusion     Tachycardia        Past Surgical Historical:   Past Surgical History:   Procedure Laterality Date    BLADDER SUSPENSION      CHOLECYSTECTOMY      EYE SURGERY      HYSTERECTOMY      MIDDLE EAR SURGERY      NECK SURGERY          Medications:   Medication List with Changes/Refills   Current Medications    ALBUTEROL (PROVENTIL/VENTOLIN HFA) 90 MCG/ACTUATION INHALER    Every 4 - 6 Hours as needed for Shortness Of Breath    ALBUTEROL-IPRATROPIUM (DUO-NEB) 2.5 MG-0.5 MG/3 ML NEBULIZER SOLUTION    Take 3 mLs by nebulization every 6 (six) hours as needed.    ASPIRIN 81 MG CHEW    81 mg.    AZELAIC ACID (AZELEX) 15 % GEL    1 application 2 (two) times daily. Apply to affected area    BD  "DEVENDRA 2ND GEN PEN NEEDLE 32 GAUGE X 5/32" NDLE    Inject 1 pen into the skin Daily.    BUDESONIDE 180MCG (PULMICORT 180MCG) 180 MCG/ACTUATION AEPB    Twice A Day    CLOBETASOL 0.05% (TEMOVATE) 0.05 % OINT    APPLY TO RASH ON LEGS TWICE DAILY FOR 2 WEEKS ON, 2 WEEKS OFF WHEN FLARED    DAPAGLIFLOZIN (FARXIGA) 10 MG TABLET    10 mg.    DIGOXIN (LANOXIN) 125 MCG TABLET    Take 0.125 mg by mouth once daily.    DIGOXIN (LANOXIN) 125 MCG TABLET    0.125 mg.    DULAGLUTIDE (TRULICITY) 3 MG/0.5 ML PEN INJECTOR    Inject 4.5 mg into the skin once a week.    FUROSEMIDE (LASIX) 80 MG TABLET    80 mg.    GLIMEPIRIDE (AMARYL) 1 MG TABLET    Take 1 mg by mouth 2 (two) times a day.    METOLAZONE (ZAROXOLYN) 2.5 MG TABLET    TAKE 1 TABLET BY MOUTH TWICE A WEEK ON TUESDAYS AND THURSDAYS    METOPROLOL TARTRATE (LOPRESSOR) 100 MG TABLET    Take 50 mg by mouth once daily.    NYSTATIN (MYCOSTATIN) POWDER    Apply topically 4 (four) times daily.    OMEPRAZOLE (PRILOSEC) 40 MG CAPSULE    Take 40 mg by mouth once daily.    POTASSIUM CHLORIDE (KLOR-CON) 10 MEQ TBSR    Daily    REPATHA SURECLICK 140 MG/ML PNIJ    INJECT 140 MG SUBCUTANEOUSLY EVERY 2 WEEKS    SILVER SULFADIAZINE 1% (SILVADENE) 1 % CREAM    APPLY CREAM TO AFFECTED AREA TWICE DAILY    TOUJEO SOLOSTAR U-300 INSULIN 300 UNIT/ML (1.5 ML) INPN PEN    Inject 20 Units into the skin Daily.    WARFARIN (COUMADIN) 1 MG TABLET    Take 3 mg by mouth. Monday, Wednesday, Friday    WARFARIN (COUMADIN) 6 MG TABLET    Take 6 mg by mouth. Sunday, Tuesday & Thursday   Discontinued Medications    DAPAGLIFLOZIN (FARXIGA) 10 MG TABLET    Take 10 mg by mouth once daily.    DOXYCYCLINE (VIBRA-TABS) 100 MG TABLET    Take 1 tablet (100 mg total) by mouth 2 (two) times daily.    FUROSEMIDE (LASIX) 80 MG TABLET    Take 80 mg by mouth once daily.    GLIMEPIRIDE (AMARYL) 1 MG TABLET    Take 1 mg by mouth every morning.    METFORMIN (GLUCOPHAGE) 1000 MG TABLET    Take 1,000 mg by mouth 3 (three) times daily. "    METOCLOPRAMIDE HCL (REGLAN) 10 MG TABLET    Take 1 tablet (10 mg total) by mouth 3 (three) times daily as needed (nausea).    METOPROLOL TARTRATE (LOPRESSOR) 100 MG TABLET    Take 100 mg by mouth 2 (two) times a day.    POTASSIUM CHLORIDE (K-TAB) 20 MEQ    Take 20 mEq by mouth 2 (two) times daily.    WARFARIN (COUMADIN) 6 MG TABLET    Take 6 mg by mouth Daily.        Past Social History:   Social History     Socioeconomic History    Marital status: Unknown   Tobacco Use    Smoking status: Never    Smokeless tobacco: Never   Substance and Sexual Activity    Alcohol use: Never    Drug use: Never    Sexual activity: Not Currently       Allergies:   Review of patient's allergies indicates:   Allergen Reactions    Egg derived      Other reaction(s): DIARRHEA    Milk      Other reaction(s): DIARRHEA    Cefazolin     Egg     Lactase     Meperidine         Family History:   Family History   Problem Relation Age of Onset    Cancer Mother     Heart disease Father     Cancer Father     Hypertension Father         Review of Systems:  Review of Systems   Constitutional:  Negative for activity change and appetite change.   HENT:  Negative for congestion and dental problem.    Respiratory:  Negative for chest tightness and shortness of breath.    Cardiovascular:  Negative for chest pain.   Gastrointestinal:  Negative for abdominal distention and abdominal pain.   Genitourinary:  Positive for enuresis. Negative for decreased urine volume, difficulty urinating, dyspareunia, dysuria, flank pain, frequency, genital sores, hematuria, pelvic pain and urgency.   Musculoskeletal:  Negative for back pain and neck pain.   Allergic/Immunologic: Negative for immunocompromised state.   Neurological:  Negative for dizziness.   Hematological:  Negative for adenopathy.   Psychiatric/Behavioral:  Negative for agitation, behavioral problems and confusion.      Physical Exam:  Physical Exam  Constitutional:       Appearance: She is  well-developed.   HENT:      Head: Normocephalic and atraumatic.   Eyes:      General: No scleral icterus.  Pulmonary:      Effort: Pulmonary effort is normal.      Breath sounds: Normal breath sounds.   Abdominal:      General: There is no distension.      Palpations: Abdomen is soft.      Tenderness: There is no abdominal tenderness.   Genitourinary:     Exam position: Supine.      Labia:         Right: No rash, tenderness or lesion.         Left: No rash, tenderness or lesion.       Vagina: Normal.      Rectum: Normal.   Musculoskeletal:      Cervical back: Normal range of motion.   Skin:     General: Skin is warm and dry.   Neurological:      Mental Status: She is alert and oriented to person, place, and time.       Assessment/Plan:   Recurring UTI--voided UA shows a contaminant with skin cells bacteria nitrite negative and white cells.  Cath UA--B BC 70 5-80, 2-4 red cells, 1+ skin cells, 2+ bacteria nitrite negative.  Culture urine.  Rx Augmentin pending sensitivities.  cath PVR--20 mL.  Once patient has completed antibiotic for the current infection will put her on low-dose maintenance antibiotic for a period of time.  Also Rx Estrace vaginal cream.  Patient denies any personal history of female cancers.    Overactive bladder--frequency urgency, Trial Motegrity 25 mg 1 daily 1 month sample given patient will call results  Problem List Items Addressed This Visit    None

## 2023-02-24 ENCOUNTER — TELEPHONE (OUTPATIENT)
Dept: UROLOGY | Facility: CLINIC | Age: 68
End: 2023-02-24
Payer: MEDICARE

## 2023-02-24 NOTE — TELEPHONE ENCOUNTER
Pt has concerns about using estradiol cream. States insert has do not use if you have htn, DM, heart issues, and her mother had breast cancer. I advised she can wait till we get response from provider.

## 2023-02-25 LAB — URINE CULTURE, ROUTINE: NORMAL

## 2023-03-01 ENCOUNTER — TELEPHONE (OUTPATIENT)
Dept: UROLOGY | Facility: CLINIC | Age: 68
End: 2023-03-01
Payer: MEDICARE

## 2023-03-01 RX ORDER — GRANULES FOR ORAL 3 G/1
3 POWDER ORAL WEEKLY
Qty: 12 G | Refills: 5 | Status: SHIPPED | OUTPATIENT
Start: 2023-03-01 | End: 2023-03-31

## 2023-03-01 NOTE — TELEPHONE ENCOUNTER
----- Message from Faby Rhodes sent at 3/1/2023  8:44 AM CST -----  Regarding: medication  Contact: patient  PT states she is at two days left of her medication and to call so she could get put on the maintenance medication and she also wants to make sure it does not interfere with her cumidine bc she had a problem with that the last time, the provider also put her on the incontinence medication but it is a little expensive but the pharmacy has a generic brand for cheaper called oxybutynin cloride if she can get that medication instead, return call [phone]    [facility] [address] [place], [place] - [address] [facility]  [address] [place] [address]  Phone: [phone] Fax: [phone]

## 2023-03-01 NOTE — TELEPHONE ENCOUNTER
Pt notified not to take if she felt uncomfortable with it and also notified medication sent to pharmacy for maintenance.

## 2023-03-03 ENCOUNTER — TELEPHONE (OUTPATIENT)
Dept: UROLOGY | Facility: CLINIC | Age: 68
End: 2023-03-03
Payer: MEDICARE

## 2023-03-03 NOTE — TELEPHONE ENCOUNTER
Pt notified of c&s.    Pt reports she can not afford new maintenance med fosfomycin.    Please advise.

## 2023-03-03 NOTE — TELEPHONE ENCOUNTER
----- Message from Brian Mosquera NP sent at 2/28/2023  6:46 PM CST -----  Please notify patient of abnormal test results.  Call patient telling positive culture sensitive to the penicillin that a started him on; finish Augmentin as prescribed

## 2023-03-06 ENCOUNTER — TELEPHONE (OUTPATIENT)
Dept: UROLOGY | Facility: CLINIC | Age: 68
End: 2023-03-06
Payer: MEDICARE

## 2023-03-06 NOTE — TELEPHONE ENCOUNTER
----- Message from Grazyna Coelho sent at 3/6/2023  8:43 AM CST -----  Type:  Needs Medical Advice    Who Called: Joanna Flores,  Symptoms (please be specific): -   How long has patient had these symptoms:  -  Pharmacy name and phone #:  -  Would the patient rather a call back or a response via [facility]? -  Best Call Back Number: [phone]    Additional Information:  pt needs to speak w/ nurse about a medication ( antibiotic) that was supposed to be called in for her ( pt says the medication that was sent was to expensive, was told that CEPHALEXIN would be more affordable)

## 2023-03-06 NOTE — TELEPHONE ENCOUNTER
The fosfomycin was for maintenance, per pt you told her you put her on one. It cost too much.    Please advise. Does not want to come in for drop off right now. States she can just go to urgent care.

## 2023-10-13 ENCOUNTER — TELEPHONE (OUTPATIENT)
Dept: FAMILY MEDICINE | Facility: CLINIC | Age: 68
End: 2023-10-13
Payer: MEDICARE

## 2025-01-22 ENCOUNTER — OUTSIDE PLACE OF SERVICE (OUTPATIENT)
Dept: GASTROENTEROLOGY | Facility: CLINIC | Age: 70
End: 2025-01-22
Payer: MEDICARE

## 2025-01-22 PROCEDURE — 99223 1ST HOSP IP/OBS HIGH 75: CPT | Mod: ,,, | Performed by: INTERNAL MEDICINE

## 2025-01-23 ENCOUNTER — OUTSIDE PLACE OF SERVICE (OUTPATIENT)
Dept: GASTROENTEROLOGY | Facility: CLINIC | Age: 70
End: 2025-01-23
Payer: MEDICARE

## 2025-01-23 PROCEDURE — 99233 SBSQ HOSP IP/OBS HIGH 50: CPT | Mod: ,,, | Performed by: INTERNAL MEDICINE

## 2025-01-24 ENCOUNTER — OUTSIDE PLACE OF SERVICE (OUTPATIENT)
Dept: GASTROENTEROLOGY | Facility: CLINIC | Age: 70
End: 2025-01-24
Payer: MEDICARE

## 2025-01-24 PROCEDURE — 99233 SBSQ HOSP IP/OBS HIGH 50: CPT | Mod: ,,, | Performed by: INTERNAL MEDICINE

## 2025-01-25 ENCOUNTER — OUTSIDE PLACE OF SERVICE (OUTPATIENT)
Dept: GASTROENTEROLOGY | Facility: CLINIC | Age: 70
End: 2025-01-25
Payer: MEDICARE

## 2025-01-25 LAB — CRC RECOMMENDATION EXT: NORMAL

## 2025-01-25 PROCEDURE — 45378 DIAGNOSTIC COLONOSCOPY: CPT | Mod: ,,, | Performed by: INTERNAL MEDICINE

## 2025-01-26 ENCOUNTER — OUTSIDE PLACE OF SERVICE (OUTPATIENT)
Dept: GASTROENTEROLOGY | Facility: CLINIC | Age: 70
End: 2025-01-26
Payer: MEDICARE

## 2025-01-26 PROCEDURE — 99233 SBSQ HOSP IP/OBS HIGH 50: CPT | Mod: ,,, | Performed by: INTERNAL MEDICINE

## 2025-03-31 ENCOUNTER — DOCUMENTATION ONLY (OUTPATIENT)
Dept: GASTROENTEROLOGY | Facility: CLINIC | Age: 70
End: 2025-03-31
Payer: MEDICARE